# Patient Record
Sex: MALE | Race: ASIAN | NOT HISPANIC OR LATINO | Employment: FULL TIME | URBAN - METROPOLITAN AREA
[De-identification: names, ages, dates, MRNs, and addresses within clinical notes are randomized per-mention and may not be internally consistent; named-entity substitution may affect disease eponyms.]

---

## 2018-08-17 ENCOUNTER — OFFICE VISIT - HEALTHEAST (OUTPATIENT)
Dept: FAMILY MEDICINE | Facility: CLINIC | Age: 23
End: 2018-08-17

## 2018-08-17 DIAGNOSIS — Z48.02 ENCOUNTER FOR REMOVAL OF SUTURES: ICD-10-CM

## 2018-08-17 DIAGNOSIS — Z76.89 ENCOUNTER TO ESTABLISH CARE: ICD-10-CM

## 2018-08-17 ASSESSMENT — MIFFLIN-ST. JEOR: SCORE: 1683.11

## 2018-11-03 ENCOUNTER — AMBULATORY - HEALTHEAST (OUTPATIENT)
Dept: NURSING | Facility: CLINIC | Age: 23
End: 2018-11-03

## 2021-06-01 VITALS — WEIGHT: 166.3 LBS | BODY MASS INDEX: 26.73 KG/M2 | HEIGHT: 66 IN

## 2021-06-19 NOTE — PROGRESS NOTES
"Assessment/Plan:        1. Encounter to establish care    2. Encounter for removal of sutures            Right wrist stiches removed    follow up prn         Subjective:    Patient ID:   America Diamond is a 23 y.o. male here to establish care and follow-up of right wrist injury at work on 8/7/2018 sustaining a laceration which was repaired at [facility].  No other concerns today      Review of Systems  General : negative  Ophthalmic : negative  ENT : negative  Respiratory : no cough, shortness of breath, or wheezing  Cardiovascular : no chest pain or dyspnea on exertion  Gastrointestinal : no abdominal pain, change in bowel habits, or black or bloody stools  Genito-Urinary :  no dysuria, trouble voiding, or hematuria  Musculoskeletal : negative  Neurological : negative  Dermatological : See HPI  Allergy: reviewed      The following patient's history were reviewed and updated as appropriate:   He  has no past medical history on file.  He  does not have any pertinent problems on file.  He  has a past surgical history that includes No past surgeries.  His family history includes No Medical Problems in his father and mother.  He  reports that he has never smoked. He has never used smokeless tobacco. He reports that he does not drink alcohol or use illicit drugs..      No outpatient encounter prescriptions on file as of 8/17/2018.     No facility-administered encounter medications on file as of 8/17/2018.          Objective:   /64 (Patient Site: Right Arm, Patient Position: Sitting, Cuff Size: Adult Regular)  Pulse 79  Temp 98.2  F (36.8  C) (Oral)   Ht 5' 5.75\" (1.67 m)  Wt 166 lb 4.8 oz (75.4 kg)  SpO2 96%  BMI 27.05 kg/m2      Physical Exam  General appearance: alert and no distress  Skin: Left wrist exam revealing healing skin with no wound dehiscence or infections stitches removed  "

## 2021-12-07 ENCOUNTER — IMMUNIZATION (OUTPATIENT)
Dept: NURSING | Facility: CLINIC | Age: 26
End: 2021-12-07
Payer: COMMERCIAL

## 2021-12-07 PROCEDURE — 91301 PR COVID VAC MODERNA 100 MCG/0.5 ML IM: CPT

## 2021-12-07 PROCEDURE — 0011A PR COVID VAC MODERNA 100 MCG/0.5 ML IM: CPT

## 2022-01-04 ENCOUNTER — IMMUNIZATION (OUTPATIENT)
Dept: NURSING | Facility: CLINIC | Age: 27
End: 2022-01-04
Attending: FAMILY MEDICINE
Payer: COMMERCIAL

## 2022-01-04 PROCEDURE — 91301 PR COVID VAC MODERNA 100 MCG/0.5 ML IM: CPT

## 2022-01-04 PROCEDURE — 0012A PR COVID VAC MODERNA 100 MCG/0.5 ML IM: CPT
